# Patient Record
Sex: MALE | Race: WHITE | URBAN - METROPOLITAN AREA
[De-identification: names, ages, dates, MRNs, and addresses within clinical notes are randomized per-mention and may not be internally consistent; named-entity substitution may affect disease eponyms.]

---

## 2021-11-09 ENCOUNTER — OFFICE VISIT (OUTPATIENT)
Dept: URBAN - METROPOLITAN AREA CLINIC 34 | Facility: CLINIC | Age: 56
End: 2021-11-09
Payer: COMMERCIAL

## 2021-11-09 PROCEDURE — 99202 OFFICE O/P NEW SF 15 MIN: CPT | Performed by: OPHTHALMOLOGY

## 2021-11-09 PROCEDURE — 92285 EXTERNAL OCULAR PHOTOGRAPHY: CPT | Performed by: OPHTHALMOLOGY

## 2021-11-09 NOTE — IMPRESSION/PLAN
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 Plan: LEft orbital cellulitis/phlegmon in the setting of severe bacterial sinusitis. Patient underwent FESS (Emelia Lai) and orbitotomy Salina Conrad) on 10/21/21, afterwhich is left orbital symptoms/exam improved significantly. 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 EOM's full. proptosis resolved. STill with some mild JUANA medial soft tissue edema, which should continue to improve over time. Sutures removed today. will re-eval in 1 month. Instructed patient to continue follow up with Dr. Emelia Lai as scheduled.

## 2021-12-08 ENCOUNTER — OFFICE VISIT (OUTPATIENT)
Dept: URBAN - METROPOLITAN AREA CLINIC 34 | Facility: CLINIC | Age: 56
End: 2021-12-08
Payer: COMMERCIAL

## 2021-12-08 PROCEDURE — 99213 OFFICE O/P EST LOW 20 MIN: CPT | Performed by: OPHTHALMOLOGY

## 2021-12-08 NOTE — IMPRESSION/PLAN
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 Plan: LEft orbital cellulitis/phlegmon in the setting of severe bacterial sinusitis. Patient reports that OS vision has been less light sensitive and slightly blurrier since leaving hospital. Denies worsening since last appointment. Patient underwent FESS (Bernabe Leelee) and orbitotomy Sherice Nazario) on 10/21/21, afterwhich is left orbital symptoms/exam improved significantly. 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 VA 20/30 OS, CV 13/14 OS. EOM's full. proptosis resolved. JUANA edema resolved. 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 patient voiced understanding.  RTC 2 months or sooner PRN

## 2022-03-01 ENCOUNTER — OFFICE VISIT (OUTPATIENT)
Dept: URBAN - METROPOLITAN AREA CLINIC 34 | Facility: CLINIC | Age: 57
End: 2022-03-01
Payer: COMMERCIAL

## 2022-03-01 DIAGNOSIS — H05.9 UNSPECIFIED DISORDER OF ORBIT: Primary | ICD-10-CM

## 2022-03-01 PROCEDURE — 92285 EXTERNAL OCULAR PHOTOGRAPHY: CPT | Performed by: OPHTHALMOLOGY

## 2022-03-01 PROCEDURE — 99024 POSTOP FOLLOW-UP VISIT: CPT | Performed by: OPHTHALMOLOGY

## 2022-03-01 NOTE — IMPRESSION/PLAN
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 Plan: LEft orbital cellulitis/phlegmon in the setting of severe bacterial sinusitis. Patient reports that OS vision has been less light sensitive and slightly blurrier since leaving hospital. Slightly worse since last appt. 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 VA 20/30 OS, CV 13/14 OS. EOM's full. proptosis resolved. JUANA edema resolved. 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 patient voiced understanding. Will refer to Dr. Kadie Mayfield for additional evaluation. All questions answered. RTC 3 months.

## 2022-06-02 ENCOUNTER — OFFICE VISIT (OUTPATIENT)
Dept: URBAN - METROPOLITAN AREA CLINIC 24 | Facility: CLINIC | Age: 57
End: 2022-06-02
Payer: COMMERCIAL

## 2022-06-02 DIAGNOSIS — H05.9 UNSPECIFIED DISORDER OF ORBIT: Primary | ICD-10-CM

## 2022-06-02 PROCEDURE — 99213 OFFICE O/P EST LOW 20 MIN: CPT | Performed by: OPHTHALMOLOGY

## 2022-06-02 NOTE — IMPRESSION/PLAN
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 Plan: LEft orbital cellulitis/phlegmon in the setting of severe bacterial sinusitis, s/p  drainage. still with intermittent double vision. reports that left eye is becoming more sensitive to light (improved from before). 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 VA 20/40, PH 20/25 OS, CV 14/14 OS. EOM's full. proptosis resolved. JUANA edema resolved. 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 patient voiced understanding. Patient has not seen Dr. Lawrence Covarrubias yet; will refer to Dr. Mayra Prince again for additional evaluation. All questions answered. RTC 6-12 months or sooner PRN.

## 2023-01-12 ENCOUNTER — OFFICE VISIT (OUTPATIENT)
Dept: URBAN - METROPOLITAN AREA CLINIC 24 | Facility: CLINIC | Age: 58
End: 2023-01-12
Payer: COMMERCIAL

## 2023-01-12 DIAGNOSIS — H05.9 UNSPECIFIED DISORDER OF ORBIT: Primary | ICD-10-CM

## 2023-01-12 PROCEDURE — 99212 OFFICE O/P EST SF 10 MIN: CPT | Performed by: OPHTHALMOLOGY
